# Patient Record
Sex: FEMALE | Race: WHITE | Employment: FULL TIME | URBAN - METROPOLITAN AREA
[De-identification: names, ages, dates, MRNs, and addresses within clinical notes are randomized per-mention and may not be internally consistent; named-entity substitution may affect disease eponyms.]

---

## 2022-09-27 ENCOUNTER — HOSPITAL ENCOUNTER (EMERGENCY)
Age: 52
Discharge: HOME | End: 2022-09-27
Attending: EMERGENCY MEDICINE
Payer: COMMERCIAL

## 2022-09-27 ENCOUNTER — APPOINTMENT (OUTPATIENT)
Dept: GENERAL RADIOLOGY | Age: 52
End: 2022-09-27
Payer: COMMERCIAL

## 2022-09-27 VITALS
OXYGEN SATURATION: 99 % | HEIGHT: 65 IN | TEMPERATURE: 98 F | WEIGHT: 168.4 LBS | SYSTOLIC BLOOD PRESSURE: 124 MMHG | HEART RATE: 72 BPM | DIASTOLIC BLOOD PRESSURE: 72 MMHG | BODY MASS INDEX: 28.06 KG/M2 | RESPIRATION RATE: 16 BRPM

## 2022-09-27 DIAGNOSIS — M79.89 MASS OF SOFT TISSUE OF WRIST: Primary | ICD-10-CM

## 2022-09-27 PROCEDURE — 73110 X-RAY EXAM OF WRIST: CPT

## 2022-09-27 PROCEDURE — 99283 EMERGENCY DEPT VISIT LOW MDM: CPT

## 2022-09-27 RX ORDER — LORATADINE 10 MG/1
10 TABLET ORAL DAILY
COMMUNITY

## 2022-09-27 RX ORDER — FAMOTIDINE 10 MG
TABLET ORAL DAILY
COMMUNITY

## 2022-09-27 ASSESSMENT — PAIN SCALES - GENERAL: PAINLEVEL_OUTOF10: 0

## 2022-09-27 ASSESSMENT — PAIN - FUNCTIONAL ASSESSMENT
PAIN_FUNCTIONAL_ASSESSMENT: NONE - DENIES PAIN
PAIN_FUNCTIONAL_ASSESSMENT: 0-10

## 2022-09-28 ASSESSMENT — ENCOUNTER SYMPTOMS: COLOR CHANGE: 0

## 2022-09-28 NOTE — ED PROVIDER NOTES
[facility]  EMERGENCY DEPARTMENTENCOUNTER      Pt Name: Audra Farah  MRN: [id number]  [place] 1970  Date ofevaluation: 9/27/2022  Provider: Brian Turk MD    CHIEF COMPLAINT       Chief Complaint   Patient presents with    Mass     C/o right wrist lump started today. She denies injury and only slight discomfort          HISTORY OF PRESENT ILLNESS   (Location/Symptom, Timing/Onset,Context/Setting, Quality, Duration, Modifying Factors, Severity)  Note limiting factors. Audra Farah is a 46 y.o. female  who  has a past medical history of [place] (hard of hearing). who presents to the emergency department for evaluation of soft tissue mass to the dorsum of the right wrist.  Patient reports noticing a nonpainful mass to the right wrist.  Denies injury or trauma. Denies numbness or weakness. Denies a history of previous. Denies overlying skin changes. She has not taken medications for symptoms. HPI    NursingNotes were reviewed. REVIEW OF SYSTEMS    (2-9 systems for level 4, 10 or more for level 5)     Review of Systems   Constitutional:  Negative for fever. Musculoskeletal:  Negative for arthralgias and joint swelling. Skin:  Negative for color change and wound. Neurological:  Negative for weakness and numbness. Except as noted above the remainder of the review of systems was reviewed and negative.        PAST MEDICAL HISTORY     Past Medical History:   Diagnosis Date    [place] (hard of hearing)     wear hearing aids         SURGICALHISTORY       Past Surgical History:   Procedure Laterality Date    APPENDECTOMY      INTRAUTERINE DEVICE INSERTION      SINUS SURGERY           CURRENT MEDICATIONS       Discharge Medication List as of 9/27/2022  7:59 PM        CONTINUE these medications which have NOT CHANGED    Details   loratadine (CLARITIN) 10 MG tablet Take 10 mg by mouth dailyHistorical Med      famotidine (PEPCID) 10 MG tablet Take by mouth dailyHistorical Med Patient has no known allergies. FAMILY HISTORY     History reviewed. No pertinent family history. SOCIAL HISTORY       Social History     Socioeconomic History    Marital status:      Spouse name: None    Number of children: None    Years of education: None    Highest education level: None   Tobacco Use    Smoking status: Every Day     Packs/day: 1.00     Types: Cigarettes    Smokeless tobacco: Never   Vaping Use    Vaping Use: Never used   Substance and Sexual Activity    Alcohol use: Yes     Comment: occ    Drug use: Never       SCREENINGS    [place] Coma Scale  Eye Opening: Spontaneous  Best Verbal Response: Oriented  Best Motor Response: Obeys commands  [place] Coma Scale Score: 15        PHYSICAL EXAM    (up to 7 for level 4, 8 or more for level 5)     ED Triage Vitals [09/27/22 1835]   BP Temp Temp Source Heart Rate Resp SpO2 Height Weight   121/70 98 °F (36.7 °C) Oral 73 16 99 % 5' 5\" (1.651 m) 168 lb 6.4 oz (76.4 kg)       Physical Exam  Vitals reviewed. Constitutional:       Appearance: She is well-developed. HENT:      Head: Normocephalic and atraumatic. Mouth/Throat:      Mouth: Mucous membranes are moist.   Eyes:      Extraocular Movements: Extraocular movements intact. Conjunctiva/sclera: Conjunctivae normal.      Pupils: Pupils are equal, round, and reactive to light. Neck:      Trachea: No tracheal deviation. Cardiovascular:      Rate and Rhythm: Normal rate and regular rhythm. Heart sounds: Normal heart sounds. Pulmonary:      Effort: Pulmonary effort is normal.      Breath sounds: Normal breath sounds. Abdominal:      General: There is no distension. Palpations: Abdomen is soft. Tenderness: There is no abdominal tenderness. Musculoskeletal:         General: No swelling, tenderness or signs of injury. Normal range of motion. Cervical back: Normal range of motion.       Comments: Approximately 2 x 2 centimeter soft tissue mass that is mobile. Appears to move with flexion of the second digit. It is compressible. Skin:     General: Skin is warm and dry. Capillary Refill: Capillary refill takes less than 2 seconds. Findings: No erythema. Neurological:      Mental Status: She is alert. RESULTS     EKG: All EKG's are interpreted by the Emergency Department Physician who either signs or Co-signsthis chart in the absence of a cardiologist.    0    RADIOLOGY:   [facility] such as CT, Ultrasound and MRI are read by the radiologist. Plain radiographic images are visualized and preliminarily interpreted by the emergency physician with the below findings:      Interpretation per the Radiologist below, if available at the time ofthis note:    XR WRIST RIGHT (MIN 3 VIEWS)   Final Result   Soft tissue swelling along the dorsum of the hand. Further evaluation with   nonemergent MRI with contrast should be considered, as radiography is very   limited when evaluating soft tissue lesions. ED BEDSIDE ULTRASOUND:   Performed by ED Physician - none    LABS:  Labs Reviewed - No data to display    All other labs were within normal range or not returned as of this dictation. EMERGENCY DEPARTMENT COURSE and DIFFERENTIAL DIAGNOSIS/MDM:   Vitals:    Vitals:    09/27/22 1835 09/27/22 2004   BP: 121/70 124/72   Pulse: 73 72   Resp: 16 16   Temp: 98 °F (36.7 °C) 98 °F (36.7 °C)   TempSrc: Oral Oral   SpO2: 99% 99%   Weight: 168 lb 6.4 oz (76.4 kg)    Height: 5' 5\" (1.651 m)        Patient was given thefollowing medications:  Medications - No data to display    ED COURSE & MEDICAL DECISION MAKING    Pertinent Labs & Imaging studies reviewed. (See chart for details)   -  Patient seen and evaluated in the emergency department. -  Triage and nursing notes reviewed and incorporated. -  Old chart records reviewed and incorporated.   -  Differential diagnosis includes: Differential diagnosis: includes but not limited to Arterial Injury/Ischemia, Fracture, Dislocation, Infection, Compartment Syndrome, Neurologic Deficit/Injury. -  Work-up included:  See above  -  ED treatment included: See above  -  Results discussed with patient. Patient presents ED for evaluation of a traumatic mass she has the dorsum of her hand. On exam it is nontender and mobile and compressible. Has well-defined margins. Imaging studies show no acute osseous abnormalities. Discussed with patient that this likely represents a ganglion cyst.  She was given orthopedic referral patient feels improved on reevaluation. Symptomatic treatment with expectant management discussed with the patient and they and/or family members present are amenable to treatment plan and outpatient follow-up. Strict return precautions were discussed with the patient and those present. They demonstrated understanding of when to return to the emergency department for new or worsening symptoms. .  The patient is agreeable with plan of care and disposition. Is this patient to be included in the SEP-1 Core Measure due to severe sepsis or septic shock? No   Exclusion criteria - the patient is NOT to be included for SEP-1 Core Measure due to: Infection is not suspected      REASSESSMENT          CRITICAL CARE TIME   Total Critical Care time was 0 minutes, excluding separately reportable procedures. There was a high probability of clinically significant/life threatening deterioration in the patient's condition which required my urgent intervention. CONSULTS:  None    PROCEDURES:  Unless otherwise noted below, none     Procedures    FINAL IMPRESSION      1.  Mass of soft tissue of wrist          DISPOSITION/PLAN   DISPOSITION Decision To Discharge 09/27/2022 07:55:19 PM      PATIENT REFERREDTO:  Courtney Hannon MD  [address] [place] [address],[address] [place] [id number]  [phone]          [facility] [address] [facility]  [address] [facility] [address] [facility] [address]  [phone]  Schedule an appointment as soon as possible for a visit         DISCHARGEMEDICATIONS:  Discharge Medication List as of 9/27/2022  7:59 PM             (Please note that portions of this note were completed with a voice recognition program.  Efforts were made to edit the dictations but occasionally words are mis-transcribed.)    Willis Carroll MD (electronically signed)  Attending Emergency Physician          Willis Carroll MD  09/28/22 [id number]

## 2022-09-28 NOTE — ED NOTES
Dr. Milan Godfrey in room to discuss radiology results and discharge plan of care with patient.       Kiana Girard RN  09/27/22 2031

## 2022-09-28 NOTE — ED NOTES
Initial contact with patient upon discharge. She denies pain. States she needs to leave now because she has not eaten all day. Discharge instructions reviewed with patient and verbalized understanding, denies further questions and successful teach back occurred. Offered wheelchair for discharge and declined. Discharged ambulatory with steady gait to ED lobby. Written discharge instructions and referral for ortho provided to patient.       Madisyn Mcgraw RN  09/27/22 2029

## 2022-09-30 ENCOUNTER — OFFICE VISIT (OUTPATIENT)
Dept: ORTHOPEDIC SURGERY | Age: 52
End: 2022-09-30
Payer: COMMERCIAL

## 2022-09-30 VITALS — WEIGHT: 168 LBS | HEIGHT: 64 IN | BODY MASS INDEX: 28.68 KG/M2

## 2022-09-30 DIAGNOSIS — M67.40 GANGLION CYST: Primary | ICD-10-CM

## 2022-09-30 PROCEDURE — 20605 DRAIN/INJ JOINT/BURSA W/O US: CPT | Performed by: ORTHOPAEDIC SURGERY

## 2022-09-30 PROCEDURE — 99203 OFFICE O/P NEW LOW 30 MIN: CPT | Performed by: ORTHOPAEDIC SURGERY

## 2022-09-30 NOTE — PROGRESS NOTES
Liz Aguilera  [id number]  September 30, 2022    Chief Complaint   Patient presents with    Wrist Pain     Right       History: The patient is a 27-year-old female who is here for evaluation of her right wrist.  She is right-hand dominant. The patient noted significant right wrist swelling on 9/27. She denies any specific injury. She has been having moderate pain. He denies any numbness or tingling. This is a consult from Nicole Herrera MD right wrist pain and swelling. The patient's  past medical history, medications, allergies,  family history, social history, and have been reviewed, and dated and are recorded in the chart. Pertinent items are noted in HPI. Review of systems reviewed from Pertinent History Form dated on 9/30 and available in the patient's chart under the Media tab. Vitals:  Ht 5' 4\" (1.626 m)   Wt 168 lb (76.2 kg)   BMI 28.84 kg/m²     Physical: On examination today, the patient is alert and oriented x3. Examination of the right shoulder is unremarkable. Examination of the right elbow is unremarkable. Examination of the right wrist reveals moderate dorsal swelling at the radiocarpal joint consistent with a ganglion cyst.  Examination of the skin reveals no lesions or ulcerations. She has pain with deep flexion and deep extension of the right wrist.  She has full pronation and supination. She is nontender in the anatomic snuffbox. She is nontender over the distal radius or the distal ulna. She is nontender over the TFCC. X-rays: 3 views of the right wrist obtained a few days ago were extensively reviewed. There are no lytic or blastic lesions within the bone. There is no evidence of fracture or dislocation. Impression: Right dorsal carpal ganglion of the wrist    Plan: Plan: After a betadine prep of the wrist, we aspirated the cyst.  We obtained approximately 1 mL of gelatinous clear fluid. The patient tolerated the aspiration rather well.  The patient was instructed to follow up immediately for any signs of infection. The patient is aware that there is a high probability of recurrence. If the patient does have a recurrence, we would recommend excision. She was encouraged to rest over the weekend. She will take ibuprofen as directed. No orders of the defined types were placed in this encounter.